# Patient Record
Sex: FEMALE | Race: WHITE
[De-identification: names, ages, dates, MRNs, and addresses within clinical notes are randomized per-mention and may not be internally consistent; named-entity substitution may affect disease eponyms.]

---

## 2018-07-14 ENCOUNTER — HOSPITAL ENCOUNTER (EMERGENCY)
Dept: HOSPITAL 56 - MW.ED | Age: 38
Discharge: HOME | End: 2018-07-14
Payer: MEDICAID

## 2018-07-14 DIAGNOSIS — R45.851: ICD-10-CM

## 2018-07-14 DIAGNOSIS — F15.10: Primary | ICD-10-CM

## 2018-07-14 DIAGNOSIS — F17.210: ICD-10-CM

## 2018-07-14 LAB
APAP SERPL-MCNC: 0 UG/ML
CHLORIDE SERPL-SCNC: 104 MMOL/L (ref 98–107)
SODIUM SERPL-SCNC: 139 MMOL/L (ref 136–145)

## 2018-07-14 PROCEDURE — 36415 COLL VENOUS BLD VENIPUNCTURE: CPT

## 2018-07-14 PROCEDURE — 85025 COMPLETE CBC W/AUTO DIFF WBC: CPT

## 2018-07-14 PROCEDURE — 93005 ELECTROCARDIOGRAM TRACING: CPT

## 2018-07-14 PROCEDURE — 80305 DRUG TEST PRSMV DIR OPT OBS: CPT

## 2018-07-14 PROCEDURE — 80053 COMPREHEN METABOLIC PANEL: CPT

## 2018-07-14 PROCEDURE — G0480 DRUG TEST DEF 1-7 CLASSES: HCPCS

## 2018-07-14 PROCEDURE — 83735 ASSAY OF MAGNESIUM: CPT

## 2018-07-14 PROCEDURE — 99285 EMERGENCY DEPT VISIT HI MDM: CPT

## 2018-07-14 PROCEDURE — 84443 ASSAY THYROID STIM HORMONE: CPT

## 2018-07-14 PROCEDURE — 81025 URINE PREGNANCY TEST: CPT

## 2018-07-14 NOTE — [id number].PDOCBH
ED HPI GENERAL MEDICAL PROBLEM





- General


Chief Complaint: Behavioral/Psych


Stated Complaint: MENTAL CONCERNS


Time Seen by Provider: 18 08:00


Source of Information: Reports: Patient


History Limitations: Reports: No Limitations





- History of Present Illness


INITIAL COMMENTS - FREE TEXT/NARRATIVE: 


History of present illness:


[]Patient has been abusing methamphetamines and is brought in by her 

significant other for rehabilitation. Patient states she has had a diagnosis of 

bipolar disorder and "multiple personalities" and has been off meds since she 

was a child. Patient has recently had suicidal thoughts and states that she 

would kill herself with electricity and/or hand for pills.





Review of systems: 


As per history of present illness and below otherwise all systems reviewed and 

negative.





Past medical history: 


As per history of present illness and as reviewed below otherwise 

noncontributory.





Surgical history: 


As per history of present illness and as reviewed below otherwise 

noncontributory.





Social history: 


No reported history of drug or alcohol abuse.





Family history: 


As per history of present illness and as reviewed below otherwise 

noncontributory.





Physical exam:


General: Well developed, well nourished in NAD


HEENT: Atraumatic, normocephalic, pupils reactive, negative for conjunctival 

pallor or scleral icterus, mucous membranes moist, throat clear, neck supple, 

nontender, trachea midline.


Lungs: Clear to auscultation, breath sounds equal bilaterally, chest nontender.


Heart: S1S2, regular, negative for clicks, rubs, or JVD.


Abdomen: Soft, nondistended, nontender. Negative for masses or 

hepatosplenomegaly. Negative for costovertebral tenderness.


Pelvis: Stable nontender.


Genitourinary: Deferred.


Rectal: Deferred.


Extremities: Atraumatic, negative for cords or calf pain. Neurovascular 

unremarkable.


Neuro: Awake, alert, oriented. Cranial nerves II through XII unremarkable. 

Cerebellum unremarkable. Motor and sensory unremarkable throughout. Exam 

nonfocal.





Diagnostics:


[]Mental health screening ordered get up with the exception of positive 

methamphetamines on her tox screen





Therapeutics:


[]Patient was placed on emergency psychiatric hold





Impression: 


[]Methamphetamine abuse, suicidal ideation with plan





Plan:


[]Transfer to [facility] to Dr. Falcon, psychiatrist, who accepts patient.





Definitive disposition and diagnosis as appropriate pending reevaluation and 

review of above.








- Related Data


 Allergies











Allergy/AdvReac Type Severity Reaction Status Date / Time


 


No Known Allergies Allergy   Verified 18 07:44











Home Meds: 


 Home Meds





. [No Known Home Meds]  18 [History]











Past Medical History


Respiratory History: Reports: Asthma


Other OB/GYN History: 


Psychiatric History: Reports: Bipolar, Schizophrenia, Other (See Below)


Other Psychiatric History: Multiple Personality Disorder





- Infectious Disease History


Infectious Disease History: Reports: Chicken Pox





Social & Family History





- Family History


Family Medical History: Noncontributory





- Tobacco Use


Smoking Status *Q: Current Every Day Smoker


Years of Tobacco use: 21


Packs/Tins Daily: 1.5


Used Tobacco, but Quit: No


Second Hand Smoke Exposure: No





- Caffeine Use


Caffeine Use: Reports: Soda





- Recreational Drug Use


Recreational Drug Use: Yes


Drug Use in Last 12 Months: Yes


Recreational Drug Type: Reports: Methamphetamine


Other Recreational Drug Type: 3-4 years ago I used to snort pills and my mom 

and sister helped me to get off them.  Patient has been using methamphetamine 

for the past couple years to help cope with mental illness.


Recreational Drug Use Frequency: Daily





ED ROS GENERAL





- Review of Systems


Review Of Systems: See Below (See history of present illness)





ED EXAM, BEHAVIORAL HEALTH





- Physical Exam


Exam: See Below (See history of present illness)





COURSE, BEHAVIORAL HEALTH COMP





- Course


Vital Signs: 


 Last Vital Signs











Temp  98.4 F   18 07:45


 


Pulse  92   18 07:45


 


Resp  28 H  18 07:45


 


BP  147/105 H  18 07:45


 


Pulse Ox  98   18 07:45











Orders, Labs, Meds: 


 Active Orders 24 hr











 Category Date Time Status


 


 EKG Documentation Completion [RC] STAT Care  18 08:03 Active


 


 Involuntary Admission/Hold [RC] ASDIRECTED Care  18 09:07 Active


 


 DRUG SCREEN, URINE [URCHEM] Stat Lab  18 08:02 Ordered


 


 HCG QUALITATIVE,URINE [URCHEM] Stat Lab  18 08:02 Ordered








 Laboratory Tests











  18 Range/Units





  08:02 08:02 08:17 


 


WBC    10.43  (4.0-11.0)  K/uL


 


RBC    4.57  (4.30-5.90)  M/uL


 


Hgb    14.7  (12.0-16.0)  g/dL


 


Hct    41.9  (36.0-46.0)  %


 


MCV    91.7  (80.0-98.0)  fL


 


MCH    32.2 H  (27.0-32.0)  pg


 


MCHC    35.1  (31.0-37.0)  g/dL


 


RDW Std Deviation    42.4  (28.0-62.0)  fl


 


RDW Coeff of Odilia    13  (11.0-15.0)  %


 


Plt Count    310  (150-400)  K/uL


 


MPV    9.30  (7.40-12.00)  fL


 


Neut % (Auto)    78.1  (48.0-80.0)  %


 


Lymph % (Auto)    13.4 L  (16.0-40.0)  %


 


Mono % (Auto)    7.4  (0.0-15.0)  %


 


Eos % (Auto)    0.9  (0.0-7.0)  %


 


Baso % (Auto)    0.2  (0.0-1.5)  %


 


Neut # (Auto)    8.2 H  (1.4-5.7)  K/uL


 


Lymph # (Auto)    1.4  (0.6-2.4)  K/uL


 


Mono # (Auto)    0.8  (0.0-0.8)  K/uL


 


Eos # (Auto)    0.1  (0.0-0.7)  K/uL


 


Baso # (Auto)    0.0  (0.0-0.1)  K/uL


 


Nucleated RBC %    0.0  /100WBC


 


Nucleated RBCs #    0  K/uL


 


Sodium     (136-145)  mmol/L


 


Potassium     (3.5-5.1)  mmol/L


 


Chloride     ()  mmol/L


 


Carbon Dioxide     (21.0-32.0)  mmol/L


 


BUN     (7.0-18.0)  mg/dL


 


Creatinine     (0.6-1.0)  mg/dL


 


Est Cr Clr Drug Dosing     mL/min


 


Estimated GFR (MDRD)     ml/min


 


Glucose     ()  mg/dL


 


Calcium     (8.5-10.1)  mg/dL


 


Magnesium     (1.8-2.4)  mg/dL


 


Total Bilirubin     (0.2-1.0)  mg/dL


 


AST     (15-37)  IU/L


 


ALT     (14-63)  IU/L


 


Alkaline Phosphatase     ()  U/L


 


Total Protein     (6.4-8.2)  g/dL


 


Albumin     (3.4-5.0)  g/dL


 


Globulin     (2.0-3.5)  g/dL


 


Albumin/Globulin Ratio     (1.3-2.8)  


 


TSH 3rd Generation     (0.36-3.74)  uIU/mL


 


Urine HCG, Qual  NEGATIVE    (NEGATIVE)  


 


Salicylates     (0-20)  mg/dL


 


Urine Opiates Screen   NEGATIVE   (NEGATIVE)  


 


Ur Oxycodone Screen   NEGATIVE   (NEGATIVE)  


 


Urine Methadone Screen   NEGATIVE   (NEGATIVE)  


 


Acetaminophen     ug/mL


 


Ur Barbiturates Screen   NEGATIVE   (NEGATIVE)  


 


Ur Phencyclidine Scrn   NEGATIVE   (NEGATIVE)  


 


Ur Amphetamine Screen   POSITIVE   (NEGATIVE)  


 


U Methamphetamines Scrn   NEGATIVE   (NEGATIVE)  


 


U Benzodiazepines Scrn   NEGATIVE   (NEGATIVE)  


 


U Cocaine Metab Screen   NEGATIVE   (NEGATIVE)  


 


U Marijuana (THC) Screen   NEGATIVE   (NEGATIVE)  


 


Ethyl Alcohol     mg/dL














  18 Range/Units





  08:17 


 


WBC   (4.0-11.0)  K/uL


 


RBC   (4.30-5.90)  M/uL


 


Hgb   (12.0-16.0)  g/dL


 


Hct   (36.0-46.0)  %


 


MCV   (80.0-98.0)  fL


 


MCH   (27.0-32.0)  pg


 


MCHC   (31.0-37.0)  g/dL


 


RDW Std Deviation   (28.0-62.0)  fl


 


RDW Coeff of Odilia   (11.0-15.0)  %


 


Plt Count   (150-400)  K/uL


 


MPV   (7.40-12.00)  fL


 


Neut % (Auto)   (48.0-80.0)  %


 


Lymph % (Auto)   (16.0-40.0)  %


 


Mono % (Auto)   (0.0-15.0)  %


 


Eos % (Auto)   (0.0-7.0)  %


 


Baso % (Auto)   (0.0-1.5)  %


 


Neut # (Auto)   (1.4-5.7)  K/uL


 


Lymph # (Auto)   (0.6-2.4)  K/uL


 


Mono # (Auto)   (0.0-0.8)  K/uL


 


Eos # (Auto)   (0.0-0.7)  K/uL


 


Baso # (Auto)   (0.0-0.1)  K/uL


 


Nucleated RBC %   /100WBC


 


Nucleated RBCs #   K/uL


 


Sodium  139  (136-145)  mmol/L


 


Potassium  3.7  (3.5-5.1)  mmol/L


 


Chloride  104  ()  mmol/L


 


Carbon Dioxide  25.9  (21.0-32.0)  mmol/L


 


BUN  12  (7.0-18.0)  mg/dL


 


Creatinine  1.0  (0.6-1.0)  mg/dL


 


Est Cr Clr Drug Dosing  67.80  mL/min


 


Estimated GFR (MDRD)  > 60.0  ml/min


 


Glucose  116 H  ()  mg/dL


 


Calcium  9.1  (8.5-10.1)  mg/dL


 


Magnesium  2.4  (1.8-2.4)  mg/dL


 


Total Bilirubin  0.8  (0.2-1.0)  mg/dL


 


AST  29  (15-37)  IU/L


 


ALT  78 H  (14-63)  IU/L


 


Alkaline Phosphatase  60  ()  U/L


 


Total Protein  8.3 H  (6.4-8.2)  g/dL


 


Albumin  4.2  (3.4-5.0)  g/dL


 


Globulin  4.1 H  (2.0-3.5)  g/dL


 


Albumin/Globulin Ratio  1.0 L  (1.3-2.8)  


 


TSH 3rd Generation  1.01  (0.36-3.74)  uIU/mL


 


Urine HCG, Qual   (NEGATIVE)  


 


Salicylates  4.1  (0-20)  mg/dL


 


Urine Opiates Screen   (NEGATIVE)  


 


Ur Oxycodone Screen   (NEGATIVE)  


 


Urine Methadone Screen   (NEGATIVE)  


 


Acetaminophen  0.0  ug/mL


 


Ur Barbiturates Screen   (NEGATIVE)  


 


Ur Phencyclidine Scrn   (NEGATIVE)  


 


Ur Amphetamine Screen   (NEGATIVE)  


 


U Methamphetamines Scrn   (NEGATIVE)  


 


U Benzodiazepines Scrn   (NEGATIVE)  


 


U Cocaine Metab Screen   (NEGATIVE)  


 


U Marijuana (THC) Screen   (NEGATIVE)  


 


Ethyl Alcohol  < 3.0  mg/dL














Departure





- Departure


Time of Disposition: 09:58


Disposition: Home, Self-Care 01


Condition: Good


Clinical Impression: 


 Methamphetamine abuse, Suicidal ideation








- Discharge Information


Referrals: 


PCP,None [Primary Care Provider] - 


Forms:  ED Department Discharge





- My Orders


Last 24 Hours: 


My Active Orders





18 08:02


DRUG SCREEN, URINE [URCHEM] Stat 


HCG QUALITATIVE,URINE [URCHEM] Stat 





18 08:03


EKG Documentation Completion [RC] STAT 





18 09:07


Involuntary Admission/Hold [RC] ASDIRECTED 














- Assessment/Plan


Last 24 Hours: 


My Active Orders





18 08:02


DRUG SCREEN, URINE [URCHEM] Stat 


HCG QUALITATIVE,URINE [URCHEM] Stat 





18 08:03


EKG Documentation Completion [RC] STAT 





18 09:07


Involuntary Admission/Hold [RC] ASDIRECTED